# Patient Record
Sex: FEMALE | Race: WHITE
[De-identification: names, ages, dates, MRNs, and addresses within clinical notes are randomized per-mention and may not be internally consistent; named-entity substitution may affect disease eponyms.]

---

## 2017-12-23 ENCOUNTER — HOSPITAL ENCOUNTER (INPATIENT)
Dept: HOSPITAL 76 - ED | Age: 72
LOS: 2 days | Discharge: HOME | DRG: 918 | End: 2017-12-25
Attending: INTERNAL MEDICINE | Admitting: FAMILY MEDICINE
Payer: MEDICARE

## 2017-12-23 ENCOUNTER — HOSPITAL ENCOUNTER (OUTPATIENT)
Dept: HOSPITAL 76 - EMS | Age: 72
Discharge: ANOTHER HOSPITAL | End: 2017-12-23
Attending: SURGERY
Payer: MEDICARE

## 2017-12-23 DIAGNOSIS — R19.7: ICD-10-CM

## 2017-12-23 DIAGNOSIS — M54.9: ICD-10-CM
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
## 2017-12-23 DIAGNOSIS — F32.9: ICD-10-CM

## 2017-12-23 DIAGNOSIS — R40.1: Primary | ICD-10-CM

## 2017-12-23 DIAGNOSIS — E11.9: ICD-10-CM

## 2017-12-23 DIAGNOSIS — E11.42: ICD-10-CM

## 2017-12-23 DIAGNOSIS — G89.29: ICD-10-CM

## 2017-12-23 DIAGNOSIS — R40.0: ICD-10-CM

## 2017-12-23 DIAGNOSIS — Z79.891: ICD-10-CM

## 2017-12-23 DIAGNOSIS — Z79.4: ICD-10-CM

## 2017-12-23 DIAGNOSIS — T40.2X1A: Primary | ICD-10-CM

## 2017-12-23 DIAGNOSIS — F41.9: ICD-10-CM

## 2017-12-23 LAB
ALBUMIN DIAFP-MCNC: 3.8 G/DL (ref 3.2–5.5)
ALBUMIN/GLOB SERPL: 1.2 {RATIO} (ref 1–2.2)
ALP SERPL-CCNC: 64 IU/L (ref 42–121)
ALT SERPL W P-5'-P-CCNC: 15 IU/L (ref 10–60)
AMPHET UR QL SCN: NEGATIVE
ANION GAP SERPL CALCULATED.4IONS-SCNC: 12 MMOL/L (ref 6–13)
APAP SERPL-MCNC: < 10 UG/ML (ref 10–30)
ARTERIAL PATENCY WRIST A: POSITIVE
AST SERPL W P-5'-P-CCNC: 30 IU/L (ref 10–42)
BASE EXCESS BLDMV CALC-SCNC: 1.2 MMOL/L (ref -2–3)
BASOPHILS NFR BLD AUTO: 0 10^3/UL (ref 0–0.1)
BASOPHILS NFR BLD AUTO: 0.3 %
BENZODIAZ UR QL SCN: NEGATIVE
BILIRUB BLD-MCNC: 0.4 MG/DL (ref 0.2–1)
BUN SERPL-MCNC: 19 MG/DL (ref 6–20)
CALCIUM UR-MCNC: 9.5 MG/DL (ref 8.5–10.3)
CHLORIDE SERPL-SCNC: 105 MMOL/L (ref 101–111)
CLARITY UR REFRACT.AUTO: (no result)
CO2 BLDA CALC-SCNC: 27.5 MMOL/L (ref 21–29)
CO2 SERPL-SCNC: 26 MMOL/L (ref 21–32)
COCAINE UR-SCNC: NEGATIVE UMOL/L
CREAT SERPLBLD-SCNC: 1 MG/DL (ref 0.4–1)
DEPRECATED HCO3 PLAS-SCNC: 26.2 MMOL/L (ref 22–26)
EOSINOPHIL # BLD AUTO: 0 10^3/UL (ref 0–0.7)
EOSINOPHIL NFR BLD AUTO: 0 %
ERYTHROCYTE [DISTWIDTH] IN BLOOD BY AUTOMATED COUNT: 16.4 % (ref 12–15)
GFRSERPLBLD MDRD-ARVRAT: 55 ML/MIN/{1.73_M2} (ref 89–?)
GLOBULIN SER-MCNC: 3.2 G/DL (ref 2.1–4.2)
GLUCOSE SERPL-MCNC: 108 MG/DL (ref 70–100)
GLUCOSE UR QL STRIP.AUTO: NEGATIVE MG/DL
HGB UR QL STRIP: 11.7 G/DL (ref 12–16)
KETONES UR QL STRIP.AUTO: (no result) MG/DL
LIPASE SERPL-CCNC: 17 U/L (ref 22–51)
LYMPHOCYTES # SPEC AUTO: 0.9 10^3/UL (ref 1.5–3.5)
LYMPHOCYTES NFR BLD AUTO: 8.5 %
MCH RBC QN AUTO: 24.7 PG (ref 27–31)
MCHC RBC AUTO-ENTMCNC: 30.9 G/DL (ref 32–36)
MCV RBC AUTO: 80 FL (ref 81–99)
METHADONE UR QL SCN: NEGATIVE
METHAMPHET UR QL SCN: NEGATIVE
MONOCYTES # BLD AUTO: 0.4 10^3/UL (ref 0–1)
MONOCYTES NFR BLD AUTO: 3.8 %
NEUTROPHILS # BLD AUTO: 9.7 10^3/UL (ref 1.5–6.6)
NEUTROPHILS # SNV AUTO: 11.1 X10^3/UL (ref 4.8–10.8)
NEUTROPHILS NFR BLD AUTO: 87.4 %
NITRITE UR QL STRIP.AUTO: NEGATIVE
OPIATES UR QL SCN: NEGATIVE
PCO2 TEMP ADJ BLDCOA: 43 MMHG (ref 34–45)
PDW BLD AUTO: 8.6 FL (ref 7.9–10.8)
PH TEMP ADJ BLDA: 7.4 [PH] (ref 7.35–7.45)
PH UR STRIP.AUTO: 6 PH (ref 5–7.5)
PLATELET # BLD: 235 10^3/UL (ref 130–450)
PO2 TEMP ADJ BLDCOA: 76 MMHG (ref 80–100)
PROT SPEC-MCNC: 7 G/DL (ref 6.7–8.2)
PROT UR STRIP.AUTO-MCNC: (no result) MG/DL
RBC # UR STRIP.AUTO: (no result) /UL
RBC # URNS HPF: (no result) /HPF (ref 0–5)
RBC MAR: 4.75 10^6/UL (ref 4.2–5.4)
SALICYLATES SERPL-MCNC: < 6 MG/DL
SAO2 % BLDA FROM PO2: 95 % (ref 94–98)
SODIUM SERPLBLD-SCNC: 143 MMOL/L (ref 135–145)
SP GR UR STRIP.AUTO: 1.02 (ref 1–1.03)
SQUAMOUS URNS QL MICRO: (no result)
UROBILINOGEN UR QL STRIP.AUTO: (no result) E.U./DL
UROBILINOGEN UR STRIP.AUTO-MCNC: NEGATIVE MG/DL
VOLATILE DRUGS POS SERPL SCN: (no result)

## 2017-12-23 PROCEDURE — 80329 ANALGESICS NON-OPIOID 1 OR 2: CPT

## 2017-12-23 PROCEDURE — 81003 URINALYSIS AUTO W/O SCOPE: CPT

## 2017-12-23 PROCEDURE — 87045 FECES CULTURE AEROBIC BACT: CPT

## 2017-12-23 PROCEDURE — 51702 INSERT TEMP BLADDER CATH: CPT

## 2017-12-23 PROCEDURE — 71010: CPT

## 2017-12-23 PROCEDURE — 81001 URINALYSIS AUTO W/SCOPE: CPT

## 2017-12-23 PROCEDURE — 80053 COMPREHEN METABOLIC PANEL: CPT

## 2017-12-23 PROCEDURE — 99285 EMERGENCY DEPT VISIT HI MDM: CPT

## 2017-12-23 PROCEDURE — 82803 BLOOD GASES ANY COMBINATION: CPT

## 2017-12-23 PROCEDURE — 83036 HEMOGLOBIN GLYCOSYLATED A1C: CPT

## 2017-12-23 PROCEDURE — 80048 BASIC METABOLIC PNL TOTAL CA: CPT

## 2017-12-23 PROCEDURE — 82140 ASSAY OF AMMONIA: CPT

## 2017-12-23 PROCEDURE — 85025 COMPLETE CBC W/AUTO DIFF WBC: CPT

## 2017-12-23 PROCEDURE — 80320 DRUG SCREEN QUANTALCOHOLS: CPT

## 2017-12-23 PROCEDURE — 99284 EMERGENCY DEPT VISIT MOD MDM: CPT

## 2017-12-23 PROCEDURE — 83690 ASSAY OF LIPASE: CPT

## 2017-12-23 PROCEDURE — 36415 COLL VENOUS BLD VENIPUNCTURE: CPT

## 2017-12-23 PROCEDURE — 36600 WITHDRAWAL OF ARTERIAL BLOOD: CPT

## 2017-12-23 PROCEDURE — 84484 ASSAY OF TROPONIN QUANT: CPT

## 2017-12-23 PROCEDURE — 87086 URINE CULTURE/COLONY COUNT: CPT

## 2017-12-23 PROCEDURE — 85610 PROTHROMBIN TIME: CPT

## 2017-12-23 PROCEDURE — 83735 ASSAY OF MAGNESIUM: CPT

## 2017-12-23 PROCEDURE — 87493 C DIFF AMPLIFIED PROBE: CPT

## 2017-12-23 PROCEDURE — 70496 CT ANGIOGRAPHY HEAD: CPT

## 2017-12-23 PROCEDURE — 96360 HYDRATION IV INFUSION INIT: CPT

## 2017-12-23 PROCEDURE — 93005 ELECTROCARDIOGRAM TRACING: CPT

## 2017-12-23 PROCEDURE — 80307 DRUG TEST PRSMV CHEM ANLYZR: CPT

## 2017-12-23 PROCEDURE — 80306 DRUG TEST PRSMV INSTRMNT: CPT

## 2017-12-23 PROCEDURE — 70450 CT HEAD/BRAIN W/O DYE: CPT

## 2017-12-23 PROCEDURE — 83605 ASSAY OF LACTIC ACID: CPT

## 2017-12-23 PROCEDURE — 87046 STOOL CULTR AEROBIC BACT EA: CPT

## 2017-12-23 NOTE — ED PHYSICIAN DOCUMENTATION
PD HPI ALTERED MENTAL STATUS





- Stated complaint


Stated Complaint: ALOC





- Chief complaint


Chief Complaint: Neuro





- History obtained from


History obtained from: Patient, Family, EMS (Medics report patient did open 

eyes and mumble to painful stimulation enroute. Did not speak to them clearly.)





- History of Present Illness


Timing - onset: Today (daughter found her mother sitting in chair unresponsive 

to verbal, and only mumbled to tactile stimulus. EMS called. No noted empty 

pill bottle, etc. Daughter says patient had mild cough for past couple days, 

otherwise has seemed well.)


Timing - duration: Hours (1)


Timing - details: Abrupt onset


Quality / character: Unresponsive


Associated symptoms: Cough.  No: Fever, Headache, NVD


Contributing factors: Diabetic.  No: Recent med change, Substance abuse


Basline status: Alert and oriented X 3, Ambulatory


Treatment PTA: Accucheck


Similar symptoms before: Has not had sx before


Recently seen: Not recently seen





Review of Systems


Unable to obtain: Unresponsive, Other (info from daughter)


Constitutional: denies: Fever


Cardiac: denies: Chest pain / pressure


Respiratory: reports: Cough


Skin: denies: Rash


Musculoskeletal: reports: Back pain (chronic).  denies: Neck pain


Neurologic: denies: Generalized weakness, Headache


Endocrine: reports: Other (no recent change in meds.)





PD PAST MEDICAL HISTORY





- Past Medical History


Cardiovascular: None


Respiratory: None


Endocrine/Autoimmune: Type 2 diabetes


GI: None


Musculoskeletal: Chronic back pain





- Present Medications


Home Medications: 


 Ambulatory Orders











 Medication  Instructions  Recorded  Confirmed


 


Amitriptyline HCl 20 mg ORAL QPM 12/23/17 12/23/17


 


Atenolol 50 mg PO DAILY 12/23/17 12/23/17


 


Atorvastatin [Lipitor] 10 mg ORAL QPM 12/23/17 12/23/17


 


Baclofen 10 mg PO TID PRN 12/23/17 12/23/17


 


Cholecalciferol (Vitamin D3) 2,000 unit PO DAILY 12/23/17 12/23/17





[Vitamin D]   


 


Fluticasone [Flonase] 2 sprays OSMAN DAILY 12/23/17 12/23/17


 


Gabapentin 600 mg PO BID 12/23/17 12/23/17


 


Insulin Glargine [Lantus Solostar] 20 unit SUBQ QPM 12/23/17 12/23/17


 


Omeprazole [PriLOSEC] 20 mg PO DAILY 12/23/17 12/23/17


 


Oxycodone HCl 5 mg PO Q4HR PRN 12/23/17 12/23/17


 


Sertraline HCl 150 mg PO DAILY 12/23/17 12/23/17














- Allergies


Allergies/Adverse Reactions: 


 Allergies











Allergy/AdvReac Type Severity Reaction Status Date / Time


 


No Known Drug Allergies Allergy   Verified 12/23/17 15:12














- Family History


Family history: reports: Non contributory





PD ED PE NORMAL





- Vitals


Vital signs reviewed: Yes





- General


General: No acute distress (unlabored breathing and does not appear in 

discomfort. Unresponsive to verbal nor light tactile. )





- HEENT


HEENT: Atraumatic, PERRL (constricted but equal and reactive)





- Neck


Neck: Supple, no meningeal sign, No adenopathy





- Cardiac


Cardiac: RRR, No murmur





- Respiratory


Respiratory: Clear bilaterally





- Abdomen


Abdomen: Soft, Non distended.  No: Normal bowel sounds (diminished)





- Female 


Female : Deferred





- Rectal


Rectal: Deferred





- Back


Back: No CVA TTP





- Derm


Derm: Normal color, Warm and dry





- Extremities


Extremities: No deformity, No edema





- Neuro


Neuro: Other (2+ DTRs at biceps and patellae. Normal Babinski. No movement 

response to painful stimulus. Minimal eye opening to painful stimulus. )


Eye Opening: To Pain


Motor: None


Verbal: None


GCS Score: 4





Results





- Vitals


Vitals: 


 Vital Signs - 24 hr











  12/23/17





  14:52


 


Temperature 36.4 C L


 


Heart Rate 71


 


Respiratory 18





Rate 


 


Blood Pressure 147/69 H


 


O2 Saturation 98








 Oxygen











O2 Source                      Room air

















- Labs


Labs: 


 Laboratory Tests











  12/23/17 12/23/17 12/23/17





  15:43 16:10 16:21


 


WBC    11.1 H


 


RBC    4.75


 


Hgb    11.7 L


 


Hct    38.0


 


MCV    80.0 L


 


MCH    24.7 L


 


MCHC    30.9 L


 


RDW    16.4 H


 


Plt Count    235


 


MPV    8.6


 


Neut #    9.7 H


 


Lymph #    0.9 L


 


Mono #    0.4


 


Eos #    0.0


 


Baso #    0.0


 


Absolute Nucleated RBC    0.00


 


Nucleated RBC %    0.0


 


Bld Gas Analysis Time   1618 


 


ABG pH   7.40 


 


ABG pCO2   43 


 


ABG pO2   76 L 


 


ABG HCO3   26.2 H 


 


ABG Total CO2   27.5 


 


ABG O2 Saturation   95 


 


ABG Oximetry Spot Check   972 


 


ABG Base Excess   1.2 


 


Delvis Test   POSITIVE 


 


Room Air   YES 


 


Sodium   


 


Potassium   


 


Chloride   


 


Carbon Dioxide   


 


Anion Gap   


 


BUN   


 


Creatinine   


 


Estimated GFR (MDRD)   


 


Glucose   


 


Lactic Acid   


 


Calcium   


 


Total Bilirubin   


 


AST   


 


ALT   


 


Alkaline Phosphatase   


 


Ammonia   


 


Total Protein   


 


Albumin   


 


Globulin   


 


Albumin/Globulin Ratio   


 


Lipase   


 


Urine Color  YELLOW  


 


Urine Clarity  HAZY  


 


Urine pH  6.0  


 


Ur Specific Gravity  1.020  


 


Urine Protein  TRACE  


 


Urine Glucose (UA)  NEGATIVE  


 


Urine Ketones  TRACE  


 


Urine Occult Blood  LARGE H  


 


Urine Nitrite  NEGATIVE  


 


Urine Bilirubin  NEGATIVE  


 


Urine Urobilinogen  0.2 (NORMAL)  


 


Ur Leukocyte Esterase  NEGATIVE  


 


Urine RBC  0-5  


 


Urine WBC  0-3  


 


Ur Squamous Epith Cells  MOD Squamous H  


 


Urine Bacteria  None Seen  


 


Ur Microscopic Review  INDICATED  


 


Urine Culture Comments  NOT INDICATED  


 


Salicylates   


 


Urine Opiates Screen  NEGATIVE  


 


Ur Oxycodone Screen  POSITIVE H  


 


Urine Methadone Screen  NEGATIVE  


 


Ur Propoxyphene Screen  NEGATIVE  


 


Acetaminophen   


 


Ur Barbiturates Screen  NEGATIVE  


 


Ur Tricyclics Screen  NEGATIVE  


 


Ur Phencyclidine Scrn  NEGATIVE  


 


Ur Amphetamine Screen  NEGATIVE  


 


U Methamphetamines Scrn  NEGATIVE  


 


U Benzodiazepines Scrn  NEGATIVE  


 


Urine Cocaine Screen  NEGATIVE  


 


U Cannabinoids Screen  NEGATIVE  


 


Ethyl Alcohol   














  12/23/17 12/23/17 12/23/17





  16:21 16:21 16:21


 


WBC   


 


RBC   


 


Hgb   


 


Hct   


 


MCV   


 


MCH   


 


MCHC   


 


RDW   


 


Plt Count   


 


MPV   


 


Neut #   


 


Lymph #   


 


Mono #   


 


Eos #   


 


Baso #   


 


Absolute Nucleated RBC   


 


Nucleated RBC %   


 


Bld Gas Analysis Time   


 


ABG pH   


 


ABG pCO2   


 


ABG pO2   


 


ABG HCO3   


 


ABG Total CO2   


 


ABG O2 Saturation   


 


ABG Oximetry Spot Check   


 


ABG Base Excess   


 


Delvis Test   


 


Room Air   


 


Sodium  143  


 


Potassium  4.0  


 


Chloride  105  


 


Carbon Dioxide  26  


 


Anion Gap  12.0  


 


BUN  19  


 


Creatinine  1.0  


 


Estimated GFR (MDRD)  55 L  


 


Glucose  108 H  


 


Lactic Acid    2.0


 


Calcium  9.5  


 


Total Bilirubin  0.4  


 


AST  30  


 


ALT  15  


 


Alkaline Phosphatase  64  


 


Ammonia   24.8 


 


Total Protein  7.0  


 


Albumin  3.8  


 


Globulin  3.2  


 


Albumin/Globulin Ratio  1.2  


 


Lipase  17 L  


 


Urine Color   


 


Urine Clarity   


 


Urine pH   


 


Ur Specific Gravity   


 


Urine Protein   


 


Urine Glucose (UA)   


 


Urine Ketones   


 


Urine Occult Blood   


 


Urine Nitrite   


 


Urine Bilirubin   


 


Urine Urobilinogen   


 


Ur Leukocyte Esterase   


 


Urine RBC   


 


Urine WBC   


 


Ur Squamous Epith Cells   


 


Urine Bacteria   


 


Ur Microscopic Review   


 


Urine Culture Comments   


 


Salicylates  < 6.0  


 


Urine Opiates Screen   


 


Ur Oxycodone Screen   


 


Urine Methadone Screen   


 


Ur Propoxyphene Screen   


 


Acetaminophen  < 10 L  


 


Ur Barbiturates Screen   


 


Ur Tricyclics Screen   


 


Ur Phencyclidine Scrn   


 


Ur Amphetamine Screen   


 


U Methamphetamines Scrn   


 


U Benzodiazepines Scrn   


 


Urine Cocaine Screen   


 


U Cannabinoids Screen   


 


Ethyl Alcohol  < 5.0  














- Rads (name of study)


  ** chest


Radiology: Prelim report reviewed, EMP read contemporaneously (no acute process)





  ** head CT


Radiology: Prelim report reviewed (no acute findings)





PD MEDICAL DECISION MAKING





- ED course


Complexity details: reviewed results, re-evaluated patient (she is still 

unresponsive to verbal nor tactile, and minimally opens eyes to painful 

stimulus. Normal reflexes and no posturing. Normal labs and vitals (does not 

appear to be hypoventilating). Consider tox effect from home meds but no 

history of overdose nor midmanagement of meds in the past. She has not improved 

over 3 hours, so will talk with hospitalist. ), considered differential, d/w 

patient





Departure





- Departure


Disposition: [facility] DC/Xfer


Clinical Impression: 


Altered mental status


Qualifiers:


 Altered mental status type: somnolence Qualified Code(s): R40.0 - Somnolence





Condition: Stable


Record reviewed to determine appropriate education?: Yes

## 2017-12-23 NOTE — HISTORY & PHYSICAL EXAMINATION
Chief Complaint





- Chief Complaint


Chief Complaint: Altered mental status





History of Present Illness





- Admitted From


Admitted From:: Home





- History of Present Illness


HPI Comment/Other: 





Ms. Juliet Mata is a 72-year-old female who lives with her daughter.  This 

morning the patient's daughter thought that her mother was just sleeping in, 

however when she went to check on her a couple of hours after when she normally 

wakes up she found her standing at the bedside, minimally responsive.The patient

's altered mental status has not changed significantly since that time.  She 

was brought into the would be general health emergency department and 

subsequently admitted to the medical surgical floor.





History





- Past Medical History


Cardiovascular: reports: None


Respiratory: reports: None


Neuro: reports: Peripheral neuropathy


Endocrine/Autoimmune: reports: Type 2 diabetes


GI: reports: None


: reports: Incontinence, Renal insuffiency


HEENT: reports: Other


Psych: reports: Depression, Anxiety


Musculoskeletal: reports: Osteoarthritis, Chronic back pain


Derm: reports: None


Other Past Medical History: dentures





- Past Surgical History


/GYN: reports: Hysterectomy


HEENT: reports: Cataracts





- Family & Social History


Family History: Mother: , Cancer, Father: , COPD/Emphysema, 

Brother: , Cancer


Living arrangement: At home


Living Situation: With family





- Substance History


Use: Uses substance without health or social issues: NONE


Abuse: Recurrent use of substance despite neg consequences: NONE


Dependence: Experiences withdrawal or developed tolerances: NONE





- POLST


Patient has POLST: Yes


POLST Status: Full Code





Meds/Allgy





- Home Medications


Home Medications: 


 Ambulatory Orders











 Medication  Instructions  Recorded  Confirmed


 


Amitriptyline HCl 20 mg ORAL QPM 17


 


Atenolol 50 mg PO DAILY 17


 


Atorvastatin [Lipitor] 10 mg ORAL QPM 17


 


Baclofen 10 mg PO TID PRN 17


 


Cholecalciferol (Vitamin D3) 2,000 unit PO DAILY 17





[Vitamin D]   


 


Fluticasone [Flonase] 2 sprays OSMAN DAILY 17


 


Gabapentin 600 mg PO BID 17


 


Insulin Glargine [Lantus Solostar] 20 unit SUBQ QPM 17


 


Omeprazole [PriLOSEC] 20 mg PO DAILY 17


 


Oxycodone HCl 5 mg PO Q4HR PRN 17


 


Sertraline HCl 150 mg PO DAILY 17














- Allergies


Allergies/Adverse Reactions: 


 Allergies











Allergy/AdvReac Type Severity Reaction Status Date / Time


 


No Known Drug Allergies Allergy   Verified 17 15:12














Review of Systems





- Neurological


Neurological: reports: Other (Patient is obtunded and minimally responsive)





- All Other Systems


All Other Systems: reports: Other (Pt is unable to participate in a review of 

systems due to her altered mental status)





Exam





- Vital Signs


Reviewed Vital Signs: Yes





- Physical Exam


General Appearance: positive: No acute distress, Lethargic.  negative: Alert


Eyes Bilateral: positive: Normal inspection, No lid inflammation, Conjunctivae 

nml, No scleral icterus


ENT: positive: ENT inspection nml, No signs of dehydration.  negative: Purulent 

nasal drainage


Neck: positive: Nml inspection, Thyroid nml, No JVD, Trachea midline.  negative

: Thyromegaly


Respiratory: positive: Chest non-tender, No respiratory distress, Breath sounds 

nml.  negative: Wheezes, Rales, Rhonchi


Cardiovascular: positive: Regular rate & rhythm, No murmur, No gallop


Peripheral Pulses: positive: 0


Abdomen: positive: No organomegaly, Nml bowel sounds, No distention.  negative: 

Hepatomegaly, Splenomegaly


Back: positive: Nml inspection.  negative: CVA tenderness (R), CVA tenderness (L

)


Skin: positive: Color nml, No rash, Warm, Dry


Extremities: positive: Nml appearance, No pedal edema.  negative: Joint swelling


Neurologic/Psychiatric: positive: Other (Obtunded and minimally responsive)





Conclusion/Plan





- Problem List


(1) Altered mental status


Conclusion/Plan: 


It is unclear as to the etiology of the patient's altered mental status at this 

time as everything has come back negative.It is possible that she took some 

medications which have caused this however the drug screen was negative.  It is 

also possible that she is suffered a neurologic event which was not evident on 

the CT of the head.  Will monitor closely overnight and support her ADLs.  If 

there has been no change in the morning we will order an MRI of the brain.


Qualifiers: 


   Altered mental status type: somnolence   Qualified Code(s): R40.0 - 

Somnolence   





- Lab Results


Fish Bones: 


 17 16:21





 17 16:21





- Diagnostic Imaging Results


Diagnostic Imaging Results: positive: Final report reviewed (EXAM: [id number] CT/

HEADWO ([id number])  EXAM:  CT HEAD   EXAM DATE: 2017 04:00 PM.   CLINICAL 

HISTORY: Altered mental status.   COMPARISON: None.   TECHNIQUE: Multiaxial CT 

images were obtained from the foramen magnum to the vertex. Reformats:  

Coronal. IV contrast: None.   In accordance with CT protocol optimization, one 

or more of the following dose reduction  techniques were utilized for this exam

: automated exposure control, adjustment of mA and/or KV  based on patient size

, or use of iterative reconstructive technique.   Findings: Relevant images are 

indicated (image number, series number).   AP, lateral view skull demonstrate 

no linear skull fracture. Patient is edentulous. Upper airway  patent.   No 

hemorrhage, mass or midline shift. No acute arterial thrombosis of the major 

arteries. Basal  cisterns patent. Orbital contents negative, patient status 

post bilateral lens surgery. Mild  generalized cortical atrophy, mild 

compensatory ventricular enlargement. Minimal scattered  suspected white matter 

disease. Paranasal sinuses, mastoid air cells clear. Skull base intact.  

Minimal calcific atherosclerotic disease present. Calvarium intact, no 

suspicious bony lesions, no  fractures.   Impressions:  1. No acute findings.  

2. Mild generalize cortical atrophy, minimal scattered suspected white matter 

disease, with  minimal calcific atherosclerotic disease. If neurological 

symptoms persist, consider MRI brain.)


Diagnostic Imaging Results Comments: 


EXAM: [id number] CT/HEADWO ([id number]) 


EXAM: 


CT HEAD 





EXAM DATE: 2017 04:00 PM. 





CLINICAL HISTORY: Altered mental status. 





COMPARISON: None. 





TECHNIQUE: Multiaxial CT images were obtained from the foramen magnum to the 

vertex. Reformats: 


Coronal. IV contrast: None. 





In accordance with CT protocol optimization, one or more of the following dose 

reduction 


techniques were utilized for this exam: automated exposure control, adjustment 

of mA and/or KV 


based on patient size, or use of iterative reconstructive technique. 





Findings: Relevant images are indicated (image number, series number). 





AP, lateral view skull demonstrate no linear skull fracture. Patient is 

edentulous. Upper airway 


patent. 





No hemorrhage, mass or midline shift. No acute arterial thrombosis of the major 

arteries. Basal 


cisterns patent. Orbital contents negative, patient status post bilateral lens 

surgery. Mild 


generalized cortical atrophy, mild compensatory ventricular enlargement. 

Minimal scattered 


suspected white matter disease. Paranasal sinuses, mastoid air cells clear. 

Skull base intact. 


Minimal calcific atherosclerotic disease present. Calvarium intact, no 

suspicious bony lesions, no 


fractures. 





Impressions: 


1. No acute findings. 


2. Mild generalize cortical atrophy, minimal scattered suspected white matter 

disease, with 


minimal calcific atherosclerotic disease. If neurological symptoms persist, 

consider MRI brain. 











Issues/Core Measures





- Anticipated LOS


Anticipated Stay Length: 2 or more midnights





- CMS Requirement for [facility]


I expect patient to be DC'd or transferred within 96 hours.: Yes





- DVT/VTE - Prophylaxis


VTE/DVT Device ordered at admit?: Yes

## 2017-12-23 NOTE — XRAY PRELIMINARY REPORT
Accession: [id number]

Exam: XR CHEST 1 VIEW

 

IMPRESSION: Normal single view chest.

 

[facility]

 

SITE ID: [id number]

## 2017-12-23 NOTE — CT PRELIMINARY REPORT
Accession: [id number]

Exam: CT HEAD W/O

 

Impressions: 

1. No acute findings. 

2. Mild generalize cortical atrophy, minimal scattered suspected white matter disease, with minimal c
alcific atherosclerotic disease. If neurological symptoms persist, consider MRI brain.

 

[facility]

 

SITE ID: [id number]

## 2017-12-23 NOTE — CT REPORT
EXAM:

CT HEAD

 

EXAM DATE: 12/23/2017 04:00 PM.

 

CLINICAL HISTORY: Altered mental status.

 

COMPARISON: None.

 

TECHNIQUE: Multiaxial CT images were obtained from the foramen magnum to the vertex. Reformats: Coron
al. IV contrast: None.

 

In accordance with CT protocol optimization, one or more of the following dose reduction techniques w
ere utilized for this exam: automated exposure control, adjustment of mA and/or KV based on patient s
ize, or use of iterative reconstructive technique.

 

Findings: Relevant images are indicated (image number, series number). 

 

AP, lateral view skull demonstrate no linear skull fracture. Patient is edentulous. Upper airway carballo
nt.

 

No hemorrhage, mass or midline shift. No acute arterial thrombosis of the major arteries. Basal ciste
rns patent. Orbital contents negative, patient status post bilateral lens surgery. Mild generalized c
ortical atrophy, mild compensatory ventricular enlargement. Minimal scattered suspected white matter 
disease. Paranasal sinuses, mastoid air cells clear. Skull base intact. Minimal calcific atherosclero
tic disease present. Calvarium intact, no suspicious bony lesions, no fractures.

 

Impressions: 

1. No acute findings. 

2. Mild generalize cortical atrophy, minimal scattered suspected white matter disease, with minimal c
alcific atherosclerotic disease. If neurological symptoms persist, consider MRI brain.

 

[facility]

Referring Provider Line: [phone]

 

SITE ID: [id number]

## 2017-12-24 LAB
ALBUMIN DIAFP-MCNC: 3.7 G/DL (ref 3.2–5.5)
ALBUMIN/GLOB SERPL: 1.2 {RATIO} (ref 1–2.2)
ALP SERPL-CCNC: 60 IU/L (ref 42–121)
ALT SERPL W P-5'-P-CCNC: 15 IU/L (ref 10–60)
ANION GAP SERPL CALCULATED.4IONS-SCNC: 10 MMOL/L (ref 6–13)
AST SERPL W P-5'-P-CCNC: 27 IU/L (ref 10–42)
BASE EXCESS BLDV CALC-SCNC: -0.1 MMOL/L
BASOPHILS NFR BLD AUTO: 0 10^3/UL (ref 0–0.1)
BASOPHILS NFR BLD AUTO: 0.6 %
BILIRUB BLD-MCNC: 0.5 MG/DL (ref 0.2–1)
BUN SERPL-MCNC: 16 MG/DL (ref 6–20)
CALCIUM UR-MCNC: 9.1 MG/DL (ref 8.5–10.3)
CHLORIDE SERPL-SCNC: 111 MMOL/L (ref 101–111)
CO2 BLDV-SCNC: 23.5 MMOL/L (ref 24–29)
CO2 SERPL-SCNC: 23 MMOL/L (ref 21–32)
CREAT SERPLBLD-SCNC: 1 MG/DL (ref 0.4–1)
EOSINOPHIL # BLD AUTO: 0 10^3/UL (ref 0–0.7)
EOSINOPHIL NFR BLD AUTO: 0.6 %
ERYTHROCYTE [DISTWIDTH] IN BLOOD BY AUTOMATED COUNT: 16.9 % (ref 12–15)
GFRSERPLBLD MDRD-ARVRAT: 55 ML/MIN/{1.73_M2} (ref 89–?)
GLOBULIN SER-MCNC: 3.2 G/DL (ref 2.1–4.2)
GLUCOSE SERPL-MCNC: 116 MG/DL (ref 70–100)
HGB UR QL STRIP: 11 G/DL (ref 12–16)
INR PPP: 1.1 (ref 0.8–1.2)
LYMPHOCYTES # SPEC AUTO: 1.1 10^3/UL (ref 1.5–3.5)
LYMPHOCYTES NFR BLD AUTO: 14.1 %
MAGNESIUM SERPL-MCNC: 1.9 MG/DL (ref 1.7–2.8)
MCH RBC QN AUTO: 25.2 PG (ref 27–31)
MCHC RBC AUTO-ENTMCNC: 31.9 G/DL (ref 32–36)
MCV RBC AUTO: 78.8 FL (ref 81–99)
MONOCYTES # BLD AUTO: 0.3 10^3/UL (ref 0–1)
MONOCYTES NFR BLD AUTO: 4.3 %
NEUTROPHILS # BLD AUTO: 6.1 10^3/UL (ref 1.5–6.6)
NEUTROPHILS # SNV AUTO: 7.6 X10^3/UL (ref 4.8–10.8)
NEUTROPHILS NFR BLD AUTO: 80.4 %
PCO2 BLDV: 30.1 MMHG (ref 41–51)
PDW BLD AUTO: 8.1 FL (ref 7.9–10.8)
PH BLDV: 7.49 [PH] (ref 7.31–7.41)
PLATELET # BLD: 220 10^3/UL (ref 130–450)
PO2 BLDV: 101.5 MMHG (ref 25–47)
PROT SPEC-MCNC: 6.9 G/DL (ref 6.7–8.2)
PROTHROM ACT/NOR PPP: 12.3 SECS (ref 9.9–12.6)
RBC MAR: 4.36 10^6/UL (ref 4.2–5.4)
SODIUM SERPLBLD-SCNC: 144 MMOL/L (ref 135–145)

## 2017-12-24 RX ADMIN — NYSTATIN SCH: 100000 POWDER TOPICAL at 07:17

## 2017-12-24 RX ADMIN — NYSTATIN SCH APPLIC: 100000 POWDER TOPICAL at 22:30

## 2017-12-24 RX ADMIN — INSULIN HUMAN SCH: 100 INJECTION, SOLUTION PARENTERAL at 18:52

## 2017-12-24 RX ADMIN — SODIUM CHLORIDE, PRESERVATIVE FREE SCH ML: 5 INJECTION INTRAVENOUS at 06:49

## 2017-12-24 RX ADMIN — INSULIN HUMAN SCH: 100 INJECTION, SOLUTION PARENTERAL at 14:29

## 2017-12-24 RX ADMIN — SODIUM CHLORIDE, PRESERVATIVE FREE SCH: 5 INJECTION INTRAVENOUS at 12:01

## 2017-12-24 RX ADMIN — NYSTATIN SCH: 100000 POWDER TOPICAL at 12:00

## 2017-12-24 RX ADMIN — POTASSIUM CHLORIDE, DEXTROSE MONOHYDRATE AND SODIUM CHLORIDE SCH MLS/HR: 150; 5; 900 INJECTION, SOLUTION INTRAVENOUS at 13:31

## 2017-12-24 RX ADMIN — ACETAMINOPHEN PRN MLS/HR: 10 INJECTION, SOLUTION INTRAVENOUS at 08:33

## 2017-12-24 RX ADMIN — POTASSIUM CHLORIDE, DEXTROSE MONOHYDRATE AND SODIUM CHLORIDE SCH MLS/HR: 150; 5; 900 INJECTION, SOLUTION INTRAVENOUS at 17:22

## 2017-12-24 RX ADMIN — ACETAMINOPHEN PRN MLS/HR: 10 INJECTION, SOLUTION INTRAVENOUS at 15:03

## 2017-12-24 RX ADMIN — SODIUM CHLORIDE, PRESERVATIVE FREE SCH ML: 5 INJECTION INTRAVENOUS at 22:31

## 2017-12-24 NOTE — CT REPORT
EXAM:

CT ANGIOGRAM HEAD.

CT SCAN OF THE HEAD WITHOUT AND WITH CONTRAST.

 

EXAM DATE: 12/24/2017 02:23 PM

 

CLINICAL HISTORY: Headache, confusion, somnolence..

 

COMPARISON: Prior CT head 12/23/2017 at 1553 hrs..

 

TECHNIQUE:

1. CT Scan Head: Using a multidetector scanner, axial images were acquired from the foramen magnum to
 the skull vertex prior to and following contrast administration. 

2. CT Angiogram: Using a multidetector scanner, high-resolution axial images were acquired from the s
kull base through vertex following rapid infusion of intravenous contrast. Reformats: Multiplanar MIP
 reformats were reconstructed. Nascet criteria used for stenosis measurement.

 

IV Contrast: 100 cc [id number].

 

In accordance with CT protocol optimization, one or more of the following dose reduction techniques w
ere utilized for this exam: automated exposure control, adjustment of mA and/or KV based on patient s
ize, or use of iterative reconstructive technique.

 

Findings: Relevant images are indicated (image number, series number).

 

Noncontrast CT head: No interval hemorrhage, mass or midline shift. Mild generalized cortical atrophy
, mild scattered periventricular, subcortical white matter disease. Asymmetrical hypodensity right ca
udate head suggestive of old ischemic disease, appears to be present previously, seen to better advan
tage on the current study. Paranasal sinuses demonstrate only mild mucosal thickening right frontal s
inus. Mastoid air cells are clear. Skull intact, no suspicious bony lesions. Minimal calcific atheros
clerotic disease.

 

Postcontrast CT head: No abnormal enhancement of the brain, meninges.

 

CT angiogram head:

 

Left ICA: 8, mild cavernous calcific atherosclerotic disease, no stenosis. Patent MCA, AKIRA distributi
on.

 

Right ICA: Patent, mild cavernous calcific atherosclerotic disease, no stenosis. Patent MCA, AKIRA dist
ribution.

 

Posterior circulation: Patent distal bilateral vertebral arteries, patent basilar artery, bilateral P
CA distribution.

 

Patent major draining veins.

 

Impression:

 

Noncontrast CT head: 

1. No acute findings. Mild scattered white matter disease including punctate suspected old ischemic d
isease present in the right caudate head. 

2. Skull intact. Minimal calcific atherosclerotic disease.

 

Postcontrast CT head: 

1. No abnormal enhancement of the brain, meninges.

 

CT angiogram head:

1. Patent major arteries of the brain, with normal anatomical variability as described. No aneurysm, 
dissection, stenosis, AVM. Mild bilateral cavernous calcific atherosclerotic disease without stenosis
.

2. Patent major veins.  

 

[facility]

Referring Provider Line: [phone]

 

SITE ID: [id number]

## 2017-12-24 NOTE — CT PRELIMINARY REPORT
Accession: [id number]

Exam: CT HEAD ANGIO

 

Impression:

 

Noncontrast CT head: 

1. No acute findings. Mild scattered white matter disease including punctate suspected old ischemic d
isease present in the right caudate head. 

2. Skull intact. Minimal calcific atherosclerotic disease.

 

Postcontrast CT head: 

1. No abnormal enhancement of the brain, meninges.

 

CT angiogram head:

1. Patent major arteries of the brain, with normal anatomical variability as described. No aneurysm, 
dissection, stenosis, AVM. Mild bilateral cavernous calcific atherosclerotic disease without stenosis
.

2. Patent major veins.  

 

[facility]

 

SITE ID: [id number]

## 2017-12-24 NOTE — PROVIDER PROGRESS NOTE
Assessment/Plan





- Problem List


(1) Altered mental status


Qualifiers: 


   Altered mental status type: somnolence   Qualified Code(s): R40.0 - 

Somnolence   


Assessment/Plan: 


CT of head was repeated with and without contrast and a CTA of brain and this 

showed no significant change form ER findings last night.


Thus, no evidence of CVA, since she has a non-focal exam.


Her exam is consistent with global amnesia as in sedation or metabolic 

encephalopathy (but the latter has been ruled out with normal CXR, UA, no DKA 

or sepsis).


When the patient described the extra codeine intake, the family heard this at 

the bedside and were very relieved.


Will continue iv hydration and no diet order til fully awake.


Possible DCh tomorrow.








(2) Diabetes


Assessment/Plan: 


Since no po diet, will continue hydration plus D5 iv and sliding scale Insulin 

only.








- Current Meds


Current Meds: 





 Current Medications











Generic Name Dose Route Start Last Admin





  Trade Name Freq  PRN Reason Stop Dose Admin


 


Acetaminophen  100 mls @ 400 mls/hr  12/24/17 08:14  12/24/17 15:20





  Ofirmev  IV   Infused





  Q6HR PRN   Infusion





  PAIN   


 


Potassium Chloride/Dextrose/Sod Cl  1,000 mls @ 150 mls/hr  12/24/17 12:40  12/ 24/17 17:22





    IV   150 mls/hr





  .Q6H40M ARGENIS   Administration


 


Insulin Human Regular  1 - 5 unit  12/24/17 14:00  12/24/17 14:29





  Novolin R  SUBQ   Not Given





  Q6HR ARGENIS   





  Protocol   


 


Lidocaine  1 patch  12/24/17 12:42  12/24/17 13:02





  Lidoderm Patch  TOP   1 patch





  DAILY PRN   Administration





  PAIN   


 


Nystatin  0 applic  12/24/17 06:00  12/24/17 12:00





  Nystop  TOP   Not Given





  BID ARGENIS   


 


Sodium Chloride  10 ml  12/24/17 06:00  12/24/17 12:01





  Normal Saline Flush 0.9%  IVP   Not Given





  Q8HR ARGENIS   














- Lab Result


Fish Bone Diagrams: 


 12/24/17 13:52





 12/24/17 13:25





- Additional Planning


My Orders: 





My Active Orders





12/24/17 08:14


Acetaminophen 1,000 mg/100 ml [Ofirmev] 100 ml IV Q6HR 





12/24/17 12:40


D5ns W/20 Meq KCl 1,000 ml  mls/hr 





12/24/17 12:42


Lidocaine Patch 5% [Lidoderm Patch]   1 patch TOP DAILY PRN 





12/24/17 12:47


DIET [NPO] [DIET] 





12/24/17 13:08


Blood Glucose POC [RC] 0000,0600,1200,1800 


Initiate Hypoglycemia Protocol [RC] .protocol 





12/24/17 14:00


Insulin Regular Human [NovoLIN R]   1 - 5 unit SUBQ Q6HR 














Subjective





- Subjective


Patient Reports: Other (In the am Pt asleep and opens eyes to name. By late am 

she was moaning in pain and answering "Yes" to headache. At mid-day, she was 

able to stand at side of bed and pivotted to a bedside commode and had a normal 

BM. In the afternoon, she was somnolent but arousable and communicating with 

family, knew them and where she was and admitted to taking excessive 

Hydrocodone doses "when she awoke in the middle of the night, taking an extra 

pill, because she forgot she already took her nightime dose")


Nursing Reports: Confused, Sedated





Objective


Vital Signs: 





 Vital Signs - 24 hr











  12/24/17 12/24/17 12/24/17





  00:33 11:59 16:47


 


Temperature 37.2 C 36.9 C 37.1 C


 


Heart Rate [ 80 65 69





Brachial]   


 


Respiratory 16 22 18





Rate   


 


Blood Pressure 145/49 H 151/54 H 151/65 H





[Right Brachial   





artery]   


 


O2 Saturation 96 95 94








 Oxygen











O2 Source                      Room air














I&O (Last 24 Hrs): 





 Intake and Output Totals x24h











 12/22/17 12/23/17 12/24/17





 23:59 23:59 23:59


 


Intake Total  200 2933.000


 


Output Total   1325


 


Balance  200 1608.000











General: Other (Somnolent. Appears very pale.)


HEENT: Atraumatic, Other (Oral mucosa and tongue dry.)


Neck: Supple, No JVD


Neuro: Disoriented, Non Focal, Other (No Babinski or motor weakness.)


Cardiovascular: Regular rate, No murmurs


Respiratory: No respiratory distress


Abdomen: Soft


Extremities: No edema





- Results


Results: 





 Laboratory Results











WBC  7.6 x10^3/uL (4.8-10.8)   12/24/17  13:52    


 


RBC  4.36 10^6/uL (4.20-5.40)   12/24/17  13:52    


 


Hgb  11.0 g/dL (12.0-16.0)  L  12/24/17  13:52    


 


Hct  34.3 % (37.0-47.0)  L  12/24/17  13:52    


 


MCV  78.8 fL (81.0-99.0)  L  12/24/17  13:52    


 


MCH  25.2 pg (27.0-31.0)  L  12/24/17  13:52    


 


MCHC  31.9 g/dL (32.0-36.0)  L  12/24/17  13:52    


 


RDW  16.9 % (12.0-15.0)  H  12/24/17  13:52    


 


Plt Count  220 10^3/uL (130-450)   12/24/17  13:52    


 


MPV  8.1 fL (7.9-10.8)   12/24/17  13:52    


 


Neut #  6.1 10^3/uL (1.5-6.6)   12/24/17  13:52    


 


Lymph #  1.1 10^3/uL (1.5-3.5)  L  12/24/17  13:52    


 


Mono #  0.3 10^3/uL (0.0-1.0)   12/24/17  13:52    


 


Eos #  0.0 10^3/uL (0.0-0.7)   12/24/17  13:52    


 


Baso #  0.0 10^3/uL (0.0-0.1)   12/24/17  13:52    


 


Absolute Nucleated RBC  0.00 x10^3/uL  12/24/17  13:52    


 


Nucleated RBC %  0.0 /100WBC  12/24/17  13:52    


 


PT  12.3 secs (9.9-12.6)   12/24/17  13:25    


 


INR  1.1  (0.8-1.2)   12/24/17  13:25    


 


Bld Gas Analysis Time  1618   12/23/17  16:10    


 


ABG pH  7.40  (7.35-7.45)   12/23/17  16:10    


 


ABG pCO2  43 mmHg (34-45)   12/23/17  16:10    


 


ABG pO2  76 mmHg ()  L  12/23/17  16:10    


 


ABG HCO3  26.2 mmol/L (22.0-26.0)  H  12/23/17  16:10    


 


ABG Total CO2  27.5 MMOL/L (21.0-29.0)   12/23/17  16:10    


 


ABG O2 Saturation  95 % (94-98)   12/23/17  16:10    


 


ABG Oximetry Spot Check  972 %  12/23/17  16:10    


 


ABG Base Excess  1.2 mmol/L (-2.0-3.0)   12/23/17  16:10    


 


Delvis Test  POSITIVE   12/23/17  16:10    


 


VBG pH  7.492  (7.31-7.41)  H  12/24/17  13:52    


 


VBG pCO2  30.1 mmHg (41-51)  L  12/24/17  13:52    


 


VBG pO2  101.5 mmHg (25-47)  H  12/24/17  13:52    


 


VBG HCO3  22.5 mmol/L (23-28)  L  12/24/17  13:52    


 


VBG Total CO2  23.5 mmol/L (24-29)  L  12/24/17  13:52    


 


VBG O2 Saturation  96.9 % (60-80)  H  12/24/17  13:52    


 


VBG Base Excess  -0.1 mmol/L (-2 - +2)   12/24/17  13:52    


 


Room Air  YES   12/23/17  16:10    


 


Sodium  144 mmol/L (135-145)   12/24/17  13:25    


 


Potassium  4.1 mmol/L (3.5-5.0)   12/24/17  13:25    


 


Chloride  111 mmol/L (101-111)   12/24/17  13:25    


 


Carbon Dioxide  23 mmol/L (21-32)   12/24/17  13:25    


 


Anion Gap  10.0  (6-13)   12/24/17  13:25    


 


BUN  16 mg/dL (6-20)   12/24/17  13:25    


 


Creatinine  1.0 mg/dL (0.4-1.0)   12/24/17  13:25    


 


Estimated GFR (MDRD)  55  (>89)  L  12/24/17  13:25    


 


Glucose  116 mg/dL ()  H  12/24/17  13:25    


 


POC Whole Bld Glucose  98 mg/dL (70 - 100)   12/23/17  16:29    


 


Lactic Acid  2.0 mmol/L (0.5-2.2)   12/23/17  16:21    


 


Calcium  9.1 mg/dL (8.5-10.3)   12/24/17  13:25    


 


Magnesium  1.9 mg/dL (1.7-2.8)   12/24/17  13:25    


 


Total Bilirubin  0.5 mg/dL (0.2-1.0)   12/24/17  13:25    


 


AST  27 IU/L (10-42)   12/24/17  13:25    


 


ALT  15 IU/L (10-60)   12/24/17  13:25    


 


Alkaline Phosphatase  60 IU/L ()   12/24/17  13:25    


 


Ammonia  24.8 umol/L (7-35)   12/23/17  16:21    


 


Troponin I  < 0.04 ng/mL (<0.49)   12/24/17  13:25    


 


Total Protein  6.9 g/dL (6.7-8.2)   12/24/17  13:25    


 


Albumin  3.7 g/dL (3.2-5.5)   12/24/17  13:25    


 


Globulin  3.2 g/dL (2.1-4.2)   12/24/17  13:25    


 


Albumin/Globulin Ratio  1.2  (1.0-2.2)   12/24/17  13:25    


 


Lipase  17 U/L (22-51)  L  12/23/17  16:21    


 


Urine Color  YELLOW   12/23/17  15:43    


 


Urine Clarity  HAZY  (CLEAR)   12/23/17  15:43    


 


Urine pH  6.0 PH (5.0-7.5)   12/23/17  15:43    


 


Ur Specific Gravity  1.020  (1.002-1.030)   12/23/17  15:43    


 


Urine Protein  TRACE mg/dL (NEGATIVE)   12/23/17  15:43    


 


Urine Glucose (UA)  NEGATIVE mg/dL (NEGATIVE)   12/23/17  15:43    


 


Urine Ketones  TRACE mg/dL (NEGATIVE)   12/23/17  15:43    


 


Urine Occult Blood  LARGE  (NEGATIVE)  H  12/23/17  15:43    


 


Urine Nitrite  NEGATIVE  (NEGATIVE)   12/23/17  15:43    


 


Urine Bilirubin  NEGATIVE  (NEGATIVE)   12/23/17  15:43    


 


Urine Urobilinogen  0.2 (NORMAL) E.U./dL (NORMAL)   12/23/17  15:43    


 


Ur Leukocyte Esterase  NEGATIVE  (NEGATIVE)   12/23/17  15:43    


 


Urine RBC  0-5 /HPF (0-5)   12/23/17  15:43    


 


Urine WBC  0-3 /HPF (0-5)   12/23/17  15:43    


 


Ur Squamous Epith Cells  MOD Squamous  (<= Few)  H  12/23/17  15:43    


 


Urine Bacteria  None Seen /HPF (None Seen)   12/23/17  15:43    


 


Ur Microscopic Review  INDICATED   12/23/17  15:43    


 


Urine Culture Comments  NOT INDICATED   12/23/17  15:43    


 


Salicylates  < 6.0 mg/dL  12/23/17  16:21    


 


Urine Opiates Screen  NEGATIVE  (NEGATIVE)   12/23/17  15:43    


 


Ur Oxycodone Screen  POSITIVE  (NEGATIVE)  H  12/23/17  15:43    


 


Urine Methadone Screen  NEGATIVE  (NEGATIVE)   12/23/17  15:43    


 


Ur Propoxyphene Screen  NEGATIVE  (NEGATIVE)   12/23/17  15:43    


 


Acetaminophen  < 10 ug/mL (10-30)  L  12/23/17  16:21    


 


Ur Barbiturates Screen  NEGATIVE  (NEGATIVE)   12/23/17  15:43    


 


Ur Tricyclics Screen  NEGATIVE  (NEGATIVE)   12/23/17  15:43    


 


Ur Phencyclidine Scrn  NEGATIVE  (NEGATIVE)   12/23/17  15:43    


 


Ur Amphetamine Screen  NEGATIVE  (NEGATIVE)   12/23/17  15:43    


 


U Methamphetamines Scrn  NEGATIVE  (NEGATIVE)   12/23/17  15:43    


 


U Benzodiazepines Scrn  NEGATIVE  (NEGATIVE)   12/23/17  15:43    


 


Urine Cocaine Screen  NEGATIVE  (NEGATIVE)   12/23/17  15:43    


 


U Cannabinoids Screen  NEGATIVE  (NEGATIVE)   12/23/17  15:43    


 


Ethyl Alcohol  < 5.0 mg/dL  12/23/17  16:21

## 2017-12-25 VITALS — SYSTOLIC BLOOD PRESSURE: 170 MMHG | DIASTOLIC BLOOD PRESSURE: 77 MMHG

## 2017-12-25 LAB
ANION GAP SERPL CALCULATED.4IONS-SCNC: 8 MMOL/L (ref 6–13)
BUN SERPL-MCNC: 11 MG/DL (ref 6–20)
CALCIUM UR-MCNC: 8.7 MG/DL (ref 8.5–10.3)
CHLORIDE SERPL-SCNC: 112 MMOL/L (ref 101–111)
CO2 SERPL-SCNC: 21 MMOL/L (ref 21–32)
CREAT SERPLBLD-SCNC: 0.9 MG/DL (ref 0.4–1)
EST. AVERAGE GLUCOSE BLD GHB EST-MCNC: 117 MG/DL (ref 70–100)
GFRSERPLBLD MDRD-ARVRAT: 62 ML/MIN/{1.73_M2} (ref 89–?)
GLUCOSE SERPL-MCNC: 154 MG/DL (ref 70–100)
HB2 TOTAL: 11.4 G/DL
HBA1C BLD-MCNC: 0.44 G/DL
HEMOGLOBIN A1C %: 5.7 % (ref 4.6–6.2)
MAGNESIUM SERPL-MCNC: 1.8 MG/DL (ref 1.7–2.8)
SODIUM SERPLBLD-SCNC: 141 MMOL/L (ref 135–145)

## 2017-12-25 RX ADMIN — ACETAMINOPHEN PRN MLS/HR: 10 INJECTION, SOLUTION INTRAVENOUS at 00:29

## 2017-12-25 RX ADMIN — INSULIN ASPART SCH UNIT: 100 INJECTION, SOLUTION INTRAVENOUS; SUBCUTANEOUS at 09:53

## 2017-12-25 RX ADMIN — INSULIN HUMAN SCH UNIT: 100 INJECTION, SOLUTION PARENTERAL at 00:27

## 2017-12-25 RX ADMIN — SODIUM CHLORIDE, PRESERVATIVE FREE SCH: 5 INJECTION INTRAVENOUS at 09:54

## 2017-12-25 RX ADMIN — POTASSIUM CHLORIDE, DEXTROSE MONOHYDRATE AND SODIUM CHLORIDE SCH MLS/HR: 150; 5; 900 INJECTION, SOLUTION INTRAVENOUS at 07:30

## 2017-12-25 RX ADMIN — INSULIN ASPART SCH UNIT: 100 INJECTION, SOLUTION INTRAVENOUS; SUBCUTANEOUS at 12:25

## 2017-12-25 RX ADMIN — POTASSIUM CHLORIDE, DEXTROSE MONOHYDRATE AND SODIUM CHLORIDE SCH MLS/HR: 150; 5; 900 INJECTION, SOLUTION INTRAVENOUS at 00:29

## 2017-12-25 RX ADMIN — SODIUM CHLORIDE, PRESERVATIVE FREE SCH ML: 5 INJECTION INTRAVENOUS at 06:27

## 2017-12-25 RX ADMIN — NYSTATIN SCH APPLIC: 100000 POWDER TOPICAL at 09:54

## 2017-12-25 RX ADMIN — INSULIN HUMAN SCH UNIT: 100 INJECTION, SOLUTION PARENTERAL at 06:26

## 2017-12-25 NOTE — DISCHARGE PLAN
Discharge Plan


Disposition: 01 Home, Self Care


Condition: Fair


Diet: Diabetic


Activity Restrictions: Activity as Tolerated


Shower Restrictions: No


Driving Restrictions: No


Instruction Topics:  ED Overdose Accidental


Additional Instructions or Follow Up instructions: 


All of your medicine ingestion should now be supervised. 





The Gabapentin should be stopped until you and your doctor determine if it 

should be resumed.





Resume all your other pre-hospital medications.





Tasha Hopper and Happy New Year!


No Smoking: If you smoke, Please STOP!  Call [phone] for help.

## 2017-12-31 NOTE — DISCHARGE SUMMARY
Physician: Aimee Remy MD

 

DATE OF ADMISSION: 12/23/2017

 

DATE OF DISCHARGE: 12/25/2017

 

HISTORY OF PRESENT ILLNESS:  The patient presented with altered mental status 
luci her daughter, who lives with

the patient, was unable to awaken her.  She had seen the patient in her normal 
state of health the night before.  An 

ambulance was called and found her minimally responsive and she was brought to 
the emergency department, remained 

unresponsive, but with unremarkable vital signs, Xrays and labs, and was then 
admitted.

 

DISCHARGE DIAGNOSES:

1.  Overdose of codeine.  The patient was worked up for metabolic 
encephalopathy from causes such as infection,

DKA, or hypercapnea and everything was negative.  The head CT showed no 
evidence of stroke or mass effect,

urine had no UTI, chest x-ray had no infiltrate, white blood count was normal, 
oxygen saturation was good.  There was

suspicion that the patient had taken excessive pain medications.  After 
approximately 30 hours, the patient 

began to awaken and confirmed this: she had taken her usual bedtime dose of 
codeine and then awoke after 

several hours, forgot that she had taken those pain meds and took the same dose 
again and possibly did this twice.  

The son confirmed to me that the patient is not normally supervised when she 
takes her medications.  Upon clearing of her 

mentation further, she agreed to have the daughter supervise all of her 
medication dosing.

2.  Diarrhea.  The patient developed diarrhea the day prior to discharge when 
she was lethargic, but entirely 

normal neurologically.  The patient and daughter confirmed that she gets 
intermittent diarrhea depending on 

her diet or other stress events.  Her diet was advanced from a clear liquid 
diet up to solids and she 

tolerated this well with no abdominal pain, vomiting or cramping.

3.  Diabetes.  The patient was initially managed with a sliding scale of 
insulin as well as D5 IV hydration.  

As her diet was advanced, carbohydrate controlled diet was ordered.

 

MEDICATIONS:  At the time of discharge were unchanged from admission and they 
were

1.  Amitriptyline 20 mg p.o. at bedtime.

2.  Atenolol 50 mg p.o. daily.

3.  Lipitor 10 mg p.o. at bedtime.

4.  Baclofen 10 mg p.o. t.i.d. p.r.n.

5.  Vitamin D3 daily.

6.  Flonase nasal spray daily.

7.  Gabapentin 600 mg p.o. b.i.d.

8.  Lantus insulin 20 units subcutaneous in the evening.

9.  Prilosec 20 mg p.o. daily.

10.  Oxycodone 5 mg p.o. every 4 hours p.r.n. pain

11.  Sertraline 150 mg p.o. daily.

 

ALLERGIES:  NO KNOWN ALLERGIES.

 

LABORATORY AND IMAGING:  Reviewed and summarized above.

 

CODE STATUS:  FULL CODE.

 

CONDITION AT DISCHARGE:  Fair.  Vital signs 160/70, heart rate 83, respiratory 
rate 20, afebrile (her blood 

pressure was elevated over the final day and she had been nearly 2 days with no 
p.o. blood pressure 

medications).  Her entire physical exam was normal, including her abdominal 
exam.

 

FOLLOWUP:  With her PCP advised in 7-10 days.

 

Time required to complete this discharge:  30 minutes.

 

 

DD: 12/30/2017 19:12

TD: 12/30/2017 20:37

Job #: [id number]

MTDD

## 2020-07-21 ENCOUNTER — HOSPITAL ENCOUNTER (OUTPATIENT)
Dept: HOSPITAL 76 - SC | Age: 75
Discharge: HOME | End: 2020-07-21
Attending: NURSE PRACTITIONER
Payer: MEDICARE

## 2020-07-21 VITALS — SYSTOLIC BLOOD PRESSURE: 118 MMHG | DIASTOLIC BLOOD PRESSURE: 70 MMHG

## 2020-07-21 DIAGNOSIS — E66.9: ICD-10-CM

## 2020-07-21 DIAGNOSIS — G47.33: Primary | ICD-10-CM

## 2020-07-21 PROCEDURE — 99204 OFFICE O/P NEW MOD 45 MIN: CPT

## 2020-07-21 PROCEDURE — 99212 OFFICE O/P EST SF 10 MIN: CPT

## 2020-07-21 NOTE — SLEEP CARE CONSULTATION
Information from patient questionnaire entered by Vidhya Bradshaw.





I have reviewed and concur with the information entered by Vidhya Bradshaw. This 

document represents the service I personally performed and the decisions made by

me, Kalina Shah ARNP.





History of Present Illness


Service Date and Time: 2020    1335


Reason for Visit: New patient, Other (sleep apnea, 16 times an hour on average)


Chief Complaint: reports: Insomnia, Unrefreshed sleep, Snoring (granddaughter), 

Excessive daytime sleepiness, Fatigue, Frequent awakenings at night.  denies: 

Observed pauses in breathing


Duration of Symptoms: years


Usual bedtime: 2100


Time it takes to fall asleep: 30-60 minutes


Snores at night: Yes (I think so?)


Observed to quit breathing while asleep: No


Number of times waking at night: 3-5


Reasons for waking at night: reports: Bathroom, Other (cats, hungry, can't 

sleep).  denies: Choking, Snoring, Gasping for air


Toss, Turn, or Twitch while sleeping: Yes


Recalls having dreams: Yes


Usually gets out of bed at: [id number]


Feels refreshed in the morning: No


Morning headache: Yes (daily migraine headaches, botox every 2 month tx now)


Sleepy or fatigued during the day: Yes


Ever fallen asleep while driving: Yes


Takes day naps: Yes


Dreams during day naps: Yes (4-5 months)


Prior sleep studies: Yes (Year unknown; at least 10 years)


Year and Where: [facility]


Additional HPI information: 





PCP thought she should get rechecked due to previous diagnosis of ADELINE and 

insomnia issues. She continue to sleeps on average 16 hours every night. She 

goes to bed at 8 while watching TV. She drinks a lot of water due to Lorin-en-Y 

surgery 20 years ago and has to go to the bathroom often.





- Parasomnia Symptoms


Ever been unable to move upon waking from sleep: No


Walks in sleep: Yes


Talks in sleep: Yes


Ever acted out dreams in sleep: No


Ever felt weak in the knees when startled or emotional: No


Bothered by creepy, crawly, restless sensations in legs: Yes (restless legs and 

feet)


Problems with memory or concentration: Yes ("can't remember anything" and "lose 

everything"; stutter alot, forget words)





Past Medical History


Past Medical History: reports: Hypertension, Diabetes, Arthritis, Gout, Anxiety,

Asthma, Depression, Mood disorder, GERD, Attention deficit, Other (High blood 

pressure; broke hip 1 year ago, surgery).  denies: Dysphagia, Congestive Heart 

Failure, Stroke, Arrythmia





Social History


The patient's occupation is retired. Patient is  and lives in [place]. 





Have you smoked in the past 12 months: No


Cigarettes per day (20/pack): 10


Years of smokin


Quit date: 


Smoking Pack Years: 12.5


Alcohol use: No


Caffeine use: Yes


Caffeine amount and frequency: 3 glasses per day





Family History


Family history of sleep disordered breathing: No


Family Hx Sleep Apnea: Father: Snoring, Sibling: Snoring





Allergies and Home Medications


Drug allergies reviewed: Yes (NKDA)


Home medication list reviewed: Yes (see list)





Review of Systems


Weight loss over past 5 years: 25


Cardiovascular: reports: high blood pressure, chest pain.  denies: palpitations,

irregular heart rate or pulse, leg or foot swelling, have to sleep sitting up


Respiratory: reports: shortness of breath, chronic cough.  denies: wheeze


Gastrointestinal: reports: heartburn, difficulty swallowing, diarrhea, abdominal

pain


Urinary: reports: incontinence, frequency, urgency


Neurological: reports: headaches, disorientation, speech dysfunction, gait or 

balance problems, fainting or unconsciousness.  denies: seizure, head trauma


Psychiatric: reports: anxiety, depression.  denies: claustrophobia


Ear/Nose/Throat: reports: nasal congestion, sinus problems, dry mouth/throat 

(mouth and throat, worse in morning), hoarseness, wisdom teeth removed.  denies:

nose bleeds, injury to nose, tonsillectomy


Endocrine: reports: sluggishness, too hot or cold, excessive thirst, increased 

urination, unexplained weakness


Musculoskeletal: reports: joint pain, neck pain, back pain, joint swelling, 

muscle pain or cramping, mobility problems


Immunologic: reports: sneezing, itching, allergies to food or environment 

(seasonal)





Physical Exam


Blood Pressure: 118/70


Cuff size: long


Heart Rate: 66


O2 Saturation: 98


Height: 4 ft 9 in


Weight: 151 lb 3.2 oz


Body Mass Index: 32.7


BMI Classification: Obese


HEENT: No craniofacial malformation


Nostrils: patent to airflow


Turbinates: normal


Septum: midline


Mouth and throat: normal


Soft palate: normal


Hard palate: normal


Uvula: normal


Uvula visualization: 50% Mallampati Class II


Tongue: normal in size


Tonsils: 1+


Chin and jaw: normal size and position


Neck: normal w/o lymphadenopathy or thyromegaly


Heart: regular rate and rhythm


Lungs: clear bilaterally





Impression and Plan





1. Obstructive Sleep Apnea-Hypopnea Syndrome, as previously diagnosed. She has 

some loud snoring, insomnia, morning headache, frequent awakening during the 

night, unrefreshed sleep, cognitive impairment, and excessive daytime 

sleepiness. She used the CPAP for 2 years but was unable to get it to fit right,

was uncomfortable and she discontinued use on her own. Narrow oropharynx and 

obesity are common predisposing factors for obstructive sleep apnea-hypopnea 

syndrome. She had a bad experience at her last sleep study where the technician 

did not respond to her during the study and they placed the head leads on with 

"wax" that she couldn't get off for a month. I recommend she talk with one of 

our technician and will have one call her tomorrow to answer her questions.





I recommend proceeding to polysomnography to confirm the diagnosis and to assess

severity. I informed the patient of what the sleep studies involve and after 

some discussion, obtained agreement to proceed. The pathophysiology of obst

ructive sleep apnea-hypopnea syndrome was discussed with the patient and health 

risks of cardiovascular and cerebrovascular disease if not treated. Risks of 

drowsy driving discussed in detail and patient advised to avoid long distance 

driving and to pull over at the first sign of drowsiness. Patient agreed to 

plan. 





* Schedule polysomnography and return in 1-2 weeks after the study to discuss 

  result and initiate therapy.


* Have technician call and answer questions about sleep study prior to study to 

  calm her fears about having the study done.


* Avoid long distance driving or driving when feeling sleepy.


* Continue to lose weight.


* Review instructions provided by trained office staff on how to prepare for the

  sleep study.


* Return for follow-up after sleep study completed.








Visit Type: In Office


Time Spent with Patient (minutes): 35


Provider Statement: I spent 100% of the Face to Face Visit with the patient with

greater than 50% spent counseling the patient and coordination of care.

## 2020-08-26 ENCOUNTER — HOSPITAL ENCOUNTER (OUTPATIENT)
Dept: HOSPITAL 76 - SC | Age: 75
Discharge: HOME | End: 2020-08-26
Attending: NURSE PRACTITIONER
Payer: MEDICARE

## 2020-08-26 DIAGNOSIS — G47.8: Primary | ICD-10-CM

## 2020-08-26 DIAGNOSIS — G47.61: ICD-10-CM

## 2020-08-26 NOTE — SLEEP CARE CONSULTATION
Information from patient questionnaire entered by Meri Hubbard.





I have reviewed and concur with the information entered by Meri Hubbard. 

This document represents the service I personally performed and the decisions 

made by me, Kalina Shah ARNP.





History of Present Illness


Service Date and Time: 08/26/2020    1300


Additional HPI information: 





MATA GUZMAN here on TeleMed phone call for follow up and results of the 

recently performed polysomnography. Her study showed upper airway resistance 

syndrome and mild periodic leg movement.





I explained the pathophysiology behind obstructive sleep apnea. We then spent 

quite a bit of time discussing different treatment options.  For mild 

obstructive sleep apnea, surgery and oral appliance are alternatives to nasal 

CPAP therapy but in moderate or severe cases, nasal CPAP is the most effective 

and reliable treatment. Patient had tried CPAP therapy 10 years ago but was 

unable to tolerate it at that time. She is unsure she wants to go ahead with 

CPAP therapy.  





She has just started taking a pill to help her incontinence that is causing dry 

mouth and sinuses. She is getting Botox injections for her headaches and takes 

tylenol as needed. She states she is unable to sleep on her back because of a 

history of left hip fracture and sciatic pain on the right.  








Sleep Study





- Results


Type of Sleep Study: Polysomnography


Prior sleep studies: Yes (Year unknown; at least 10 years)


Year and Where: [facility]


Polysomnography/Home Sleep Study results: 





IMPRESSION: The quality of the study is good. The patient had reduced sleep 

efficiency due to sleep onset


insomnia and prolonged awakenings during the night. The sleep architecture was 

abnormal for sleep fragmentation


and reduced amount of time spent in REM sleep. Respiratory monitoring showed 

evidence of upper airway


resistance (AHI = 3.3 and RDI = 9.7) associated with frequent arousals but no 

hypoxia (emmett oxygen saturation of


90%). The patient did not sleep supine during this study. Snore was light in 

intensity. There was mild periodic leg


movement of sleep not contributing to the sleep fragmentation.. Cardiac rhythm 

was normal sinus rhythm without


significant arrhythmia. No abnormal behavior (parasomnia) observed during the 

night.





CONCLUSIONS and RECOMMENDATIONS:


1. 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

respiratory


related arousals and elevated RDI. A trial of positive airway pressure therapy 

is recommended


given the above symptoms.


2. Periodic leg movement (ICD G47.61), mild, treatment may be indicated. 

Clinical correlation


advised.





Allergies and Home Medications


Drug allergies reviewed: Yes (NKDA)


Home medication list reviewed: Yes (no changes)





Review of Systems


Review of systems same as previous: Yes (no changes)





Physical Exam


Vital signs obtained and entered by: Telephone encounter


Height: 4 ft 9 in





Impression and Plan





1. 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 

90%. Patient had an AHI of 3.3 and a RDI elevated at 9.7 in non-supine sleep, 

patient only able to sleep on her side. Patient does not sleep supine. Positive 

pressure therapy could benefit her Upper Airway Resistance but patient wants to 

think about this before proceeding. She will call the office back for follow up 

and decision about a trial of CPAP therapy.  





2. Periodic limb movement, mild, that did not fragment patients sleep. Periodic

limb movement of sleep (PLMS) is characterized by episodes of repetitive limb 

movements that occur during sleep and usually involve the lower limbs. The 

etiology is unknown but can be associated with restless leg syndrome (RLS), 

neuropathy, spinal cord diseases, kidney disease, rheumatological disorders, 

narcolepsy, obstructive sleep apnea, and REM sleep behavior disorder. Other 

factors that can increase PLMS and/or RLS are heredity and iron deficiency as 

reflected by a low serum ferritin level below 50 to 75mcg / L. Several 

medications can precipitate or aggravate PLMS such as selective serotonin re-

uptake inhibitor antidepressants, tricyclic antidepressants, lithium, and 

dopamine  receptor antagonists with the exception of bupropion. Caffeine can 

also aggravate PLMS and should be avoided. Sleep hygiene methods can also 

improve sleep as well as lifestyle changes such as regular exercise. Patient was

advised that no treatment is needed at this time. If symptoms increase, then 

further evaluation is indicated.





* Patient will consider recommendations and call office with her decision.


* Patient to follow up with PCP as needed for her mild periodic limb movement.


* The patient is again cautioned about driving until sleepiness completely 

  resolves.


* Return in one month after start of CPAP if she decides to start therapy or prn

  with worsening symptoms.





Visit Type: Telehealth Phone


Patient Location: Home


Location of Provider: Office


Patient agrees and consents to this telehealth visit type: Yes


Patient agrees to have their insurance billed: Yes


Time Spent with Patient (minutes): 7


Provider Statement: I spent 100% of the Telehealth Phone Call with the patient 

with greater than 50% spent counseling the patient and coordination of care.

## 2020-12-08 ENCOUNTER — HOSPITAL ENCOUNTER (OUTPATIENT)
Dept: HOSPITAL 76 - COV | Age: 75
Discharge: HOME | End: 2020-12-08
Attending: FAMILY MEDICINE
Payer: MEDICARE

## 2020-12-08 DIAGNOSIS — Z20.828: ICD-10-CM

## 2020-12-08 DIAGNOSIS — R07.0: Primary | ICD-10-CM

## 2020-12-08 DIAGNOSIS — J34.89: ICD-10-CM

## 2020-12-08 DIAGNOSIS — R09.81: ICD-10-CM

## 2021-09-21 ENCOUNTER — HOSPITAL ENCOUNTER (OUTPATIENT)
Dept: HOSPITAL 76 - LAB.S | Age: 76
Discharge: HOME | End: 2021-09-21
Attending: INTERNAL MEDICINE
Payer: MEDICARE

## 2021-09-21 DIAGNOSIS — N18.32: Primary | ICD-10-CM

## 2021-09-21 LAB
ANION GAP SERPL CALCULATED.4IONS-SCNC: 9 MMOL/L (ref 6–13)
BASOPHILS NFR BLD AUTO: 0 10^3/UL (ref 0–0.1)
BASOPHILS NFR BLD AUTO: 0.6 %
BUN SERPL-MCNC: 28 MG/DL (ref 6–20)
CALCIUM UR-MCNC: 9.3 MG/DL (ref 8.5–10.3)
CHLORIDE SERPL-SCNC: 105 MMOL/L (ref 101–111)
CLARITY UR REFRACT.AUTO: (no result)
CO2 SERPL-SCNC: 28 MMOL/L (ref 21–32)
CREAT SERPLBLD-SCNC: 1.1 MG/DL (ref 0.4–1)
CREAT UR-SCNC: 215.9 MG/DL
EOSINOPHIL # BLD AUTO: 0.2 10^3/UL (ref 0–0.7)
EOSINOPHIL NFR BLD AUTO: 3.5 %
ERYTHROCYTE [DISTWIDTH] IN BLOOD BY AUTOMATED COUNT: 13.3 % (ref 12–15)
GFRSERPLBLD MDRD-ARVRAT: 48 ML/MIN/{1.73_M2} (ref 89–?)
GLUCOSE SERPL-MCNC: 118 MG/DL (ref 70–100)
GLUCOSE UR QL STRIP.AUTO: NEGATIVE MG/DL
HCT VFR BLD AUTO: 40.4 % (ref 37–47)
HGB UR QL STRIP: 12.5 G/DL (ref 12–16)
KETONES UR QL STRIP.AUTO: NEGATIVE MG/DL
LYMPHOCYTES # SPEC AUTO: 1.3 10^3/UL (ref 1.5–3.5)
LYMPHOCYTES NFR BLD AUTO: 20 %
MAGNESIUM SERPL-MCNC: 2 MG/DL (ref 1.7–2.8)
MCH RBC QN AUTO: 30.9 PG (ref 27–31)
MCHC RBC AUTO-ENTMCNC: 30.9 G/DL (ref 32–36)
MCV RBC AUTO: 100 FL (ref 81–99)
MICROALBUMIN UR-MCNC: 0.5 MG/DL (ref 0–300)
MICROALBUMIN/CREAT RATIO PNL UR: 2.3 UG/MG (ref ?–30)
MONOCYTES # BLD AUTO: 0.5 10^3/UL (ref 0–1)
MONOCYTES NFR BLD AUTO: 7.4 %
MUCOUS THREADS URNS QL MICRO: (no result)
NEUTROPHILS # BLD AUTO: 4.5 10^3/UL (ref 1.5–6.6)
NEUTROPHILS # SNV AUTO: 6.6 X10^3/UL (ref 4.8–10.8)
NEUTROPHILS NFR BLD AUTO: 68.3 %
NITRITE UR QL STRIP.AUTO: NEGATIVE
NRBC # BLD AUTO: 0 /100WBC
NRBC # BLD AUTO: 0 X10^3/UL
PDW BLD AUTO: 10.6 FL (ref 7.9–10.8)
PH UR STRIP.AUTO: 5.5 PH (ref 5–7.5)
PHOSPHATE BLD-MCNC: 3.6 MG/DL (ref 2.5–4.6)
PLATELET # BLD: 246 10^3/UL (ref 130–450)
POTASSIUM SERPL-SCNC: 4.2 MMOL/L (ref 3.5–5)
PROT UR STRIP.AUTO-MCNC: NEGATIVE MG/DL
RBC # UR STRIP.AUTO: NEGATIVE /UL
RBC # URNS HPF: (no result) /HPF (ref 0–5)
RBC MAR: 4.04 10^6/UL (ref 4.2–5.4)
SODIUM SERPLBLD-SCNC: 142 MMOL/L (ref 135–145)
SP GR UR STRIP.AUTO: 1.02 (ref 1–1.03)
SQUAMOUS URNS QL MICRO: (no result)
URATE SERPL-MCNC: 4.8 MG/DL (ref 2.6–7.2)
UROBILINOGEN UR QL STRIP.AUTO: (no result) E.U./DL
UROBILINOGEN UR STRIP.AUTO-MCNC: NEGATIVE MG/DL
WBC # UR MANUAL: (no result) /HPF (ref 0–5)

## 2021-09-21 PROCEDURE — 36415 COLL VENOUS BLD VENIPUNCTURE: CPT

## 2021-09-21 PROCEDURE — 82306 VITAMIN D 25 HYDROXY: CPT

## 2021-09-21 PROCEDURE — 84550 ASSAY OF BLOOD/URIC ACID: CPT

## 2021-09-21 PROCEDURE — 83970 ASSAY OF PARATHORMONE: CPT

## 2021-09-21 PROCEDURE — 82570 ASSAY OF URINE CREATININE: CPT

## 2021-09-21 PROCEDURE — 81003 URINALYSIS AUTO W/O SCOPE: CPT

## 2021-09-21 PROCEDURE — 85025 COMPLETE CBC W/AUTO DIFF WBC: CPT

## 2021-09-21 PROCEDURE — 84100 ASSAY OF PHOSPHORUS: CPT

## 2021-09-21 PROCEDURE — 87086 URINE CULTURE/COLONY COUNT: CPT

## 2021-09-21 PROCEDURE — 82043 UR ALBUMIN QUANTITATIVE: CPT

## 2021-09-21 PROCEDURE — 81001 URINALYSIS AUTO W/SCOPE: CPT

## 2021-09-21 PROCEDURE — 83735 ASSAY OF MAGNESIUM: CPT

## 2021-09-21 PROCEDURE — 80048 BASIC METABOLIC PNL TOTAL CA: CPT

## 2021-09-30 ENCOUNTER — HOSPITAL ENCOUNTER (OUTPATIENT)
Dept: HOSPITAL 76 - DI | Age: 76
Discharge: HOME | End: 2021-09-30
Attending: OBSTETRICS & GYNECOLOGY
Payer: MEDICARE

## 2021-09-30 DIAGNOSIS — N18.32: Primary | ICD-10-CM

## 2021-10-01 NOTE — ULTRASOUND REPORT
PROCEDURE:  Retroperitoneal

 

INDICATIONS:  CHRONIC KIDNEY DISEASE

 

TECHNIQUE:  

Real-time scanning was performed of the retroperitoneal organs, with image documentation.  

 

COMPARISON:  None.

 

FINDINGS:     

 

Kidneys:  Right kidney measures 9 cm long; left kidney measures 8.8 cm long.  Right renal cortical th
ickness is 1 cm; left renal cortical thickness is 0.9 cm.  There is preserved corticomedullary differ
entiation. No hydronephrosis. There are bilateral extrarenal pelves.

 

Bladder:  Initial bladder volume measures 97 mL. Postvoid residual volume measured 7 mL. Bilateral ur
eteral jets visualized.

 

IMPRESSION:  

 

1. Bilateral extrarenal pelves without definite hydronephrosis.

 

2. Bilateral renal cortical thinning.

 

Reviewed by: Butch Lange MD on 10/1/2021 2:23 PM PDT

Approved by: Butch Lange MD on 10/1/2021 2:23 PM PDT

 

 

Station ID:  [id number]

## 2022-06-22 ENCOUNTER — HOSPITAL ENCOUNTER (OUTPATIENT)
Dept: HOSPITAL 76 - DI.S | Age: 77
Discharge: HOME | End: 2022-06-22
Attending: NURSE PRACTITIONER
Payer: MEDICARE

## 2022-06-22 DIAGNOSIS — S60.221A: Primary | ICD-10-CM

## 2022-06-22 DIAGNOSIS — M79.641: ICD-10-CM

## 2022-06-23 NOTE — XRAY REPORT
PROCEDURE:  Hand 3 View RT

 

INDICATIONS:  CONTUSION OF RIGHT HAND

 

TECHNIQUE:  3 views of the hand(s) acquired.  

 

COMPARISON:  None

 

FINDINGS:  

 

Bones:  No fractures or dislocations.  No suspicious bony lesions.  Severe first CMC, first DIP, seco
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 Mild osteoarthritis noted in the metacarpal phalange join
ts and the third, fourth and fifth interphalangeal joints.

 

Soft tissues:  No suspicious soft tissue calcifications.  

 

IMPRESSION:  

No fracture. No acute osseous lesion. If symptoms and/or clinical concern for pathology persists, fur
ther assessment with repeat plain film radiographs (7-10 days) or advanced imaging (CT, MR, bone scan
) should be considered.

 

 

Reviewed by: Ritika Bray MD, PhD on 6/23/2022 10:20 AM PDT

Approved by: Ritika Bray MD, PhD on 6/23/2022 10:20 AM PDT

 

 

Station ID:  [id number]